# Patient Record
Sex: FEMALE | URBAN - METROPOLITAN AREA
[De-identification: names, ages, dates, MRNs, and addresses within clinical notes are randomized per-mention and may not be internally consistent; named-entity substitution may affect disease eponyms.]

---

## 2019-10-16 NOTE — PATIENT DISCUSSION
PT NOT INTERESTED IN MV CORRECTION 2' NIGHT TIME DRIVING, FISHING &amp; HUNTING. NOT INTERESTED IN MF RLE 2' PRICE. RTC PRN.

## 2020-11-05 ENCOUNTER — IMPORTED ENCOUNTER (OUTPATIENT)
Dept: URBAN - METROPOLITAN AREA CLINIC 9 | Facility: CLINIC | Age: 60
End: 2020-11-05

## 2021-01-13 ENCOUNTER — IMPORTED ENCOUNTER (OUTPATIENT)
Dept: URBAN - METROPOLITAN AREA CLINIC 9 | Facility: CLINIC | Age: 61
End: 2021-01-13

## 2021-02-08 ENCOUNTER — IMPORTED ENCOUNTER (OUTPATIENT)
Dept: URBAN - METROPOLITAN AREA CLINIC 9 | Facility: CLINIC | Age: 61
End: 2021-02-08

## 2021-10-16 ASSESSMENT — TONOMETRY
OS_IOP_MMHG: 18
OD_IOP_MMHG: 18
OS_IOP_MMHG: 17
OD_IOP_MMHG: 18
OS_IOP_MMHG: 20
OD_IOP_MMHG: 18

## 2021-10-16 ASSESSMENT — KERATOMETRY
OS_K1POWER_DIOPTERS: 45.5
OD_AXISANGLE_DEGREES: 162
OS_K2POWER_DIOPTERS: 46.25
OD_AXISANGLE2_DEGREES: 72
OS_AXISANGLE2_DEGREES: 92
OD_K2POWER_DIOPTERS: 46.5
OS_AXISANGLE_DEGREES: 2
OD_K1POWER_DIOPTERS: 46.25

## 2021-10-16 ASSESSMENT — VISUAL ACUITY
OS_SC: 20/20 SN
OD_SC: 20/20 -2 SN
OD_SC: 20/20 SN
OD_SC: 20/30 - SN
OS_SC: 20/25 - SN
OS_SC: 20/20 SN

## 2022-06-21 RX ORDER — NAPROXEN 500 MG/1
TABLET ORAL
COMMUNITY

## 2022-06-21 RX ORDER — TIZANIDINE 2 MG/1
TABLET ORAL
COMMUNITY

## 2022-06-21 RX ORDER — DIAZEPAM 10 MG/1
TABLET ORAL
COMMUNITY
Start: 2019-04-23